# Patient Record
Sex: MALE
[De-identification: names, ages, dates, MRNs, and addresses within clinical notes are randomized per-mention and may not be internally consistent; named-entity substitution may affect disease eponyms.]

---

## 2021-08-03 PROBLEM — Z00.00 ENCOUNTER FOR PREVENTIVE HEALTH EXAMINATION: Status: ACTIVE | Noted: 2021-08-03

## 2023-03-19 ENCOUNTER — NON-APPOINTMENT (OUTPATIENT)
Age: 53
End: 2023-03-19

## 2023-03-19 ENCOUNTER — EMERGENCY (EMERGENCY)
Facility: HOSPITAL | Age: 53
LOS: 1 days | Discharge: HOME | End: 2023-03-19
Attending: EMERGENCY MEDICINE
Payer: COMMERCIAL

## 2023-03-19 VITALS
DIASTOLIC BLOOD PRESSURE: 68 MMHG | OXYGEN SATURATION: 100 % | TEMPERATURE: 98 F | RESPIRATION RATE: 18 BRPM | SYSTOLIC BLOOD PRESSURE: 113 MMHG | HEART RATE: 62 BPM

## 2023-03-19 VITALS
WEIGHT: 169.98 LBS | SYSTOLIC BLOOD PRESSURE: 158 MMHG | RESPIRATION RATE: 16 BRPM | TEMPERATURE: 98 F | HEIGHT: 69 IN | HEART RATE: 59 BPM | DIASTOLIC BLOOD PRESSURE: 90 MMHG | OXYGEN SATURATION: 98 %

## 2023-03-19 LAB
ALBUMIN SERPL ELPH-MCNC: 4.1 G/DL — SIGNIFICANT CHANGE UP (ref 3.3–5)
ALP SERPL-CCNC: 57 U/L — SIGNIFICANT CHANGE UP (ref 40–120)
ALT FLD-CCNC: 18 U/L — SIGNIFICANT CHANGE UP (ref 10–45)
ANION GAP SERPL CALC-SCNC: 14 MMOL/L — SIGNIFICANT CHANGE UP (ref 5–17)
APTT BLD: 23.2 SEC — LOW (ref 27.5–35.5)
AST SERPL-CCNC: 38 U/L — SIGNIFICANT CHANGE UP (ref 10–40)
BASOPHILS # BLD AUTO: 0 K/UL — SIGNIFICANT CHANGE UP (ref 0–0.2)
BASOPHILS NFR BLD AUTO: 0 % — SIGNIFICANT CHANGE UP (ref 0–2)
BILIRUB SERPL-MCNC: 0.4 MG/DL — SIGNIFICANT CHANGE UP (ref 0.2–1.2)
BUN SERPL-MCNC: 14 MG/DL — SIGNIFICANT CHANGE UP (ref 7–23)
CALCIUM SERPL-MCNC: 8.8 MG/DL — SIGNIFICANT CHANGE UP (ref 8.4–10.5)
CHLORIDE SERPL-SCNC: 106 MMOL/L — SIGNIFICANT CHANGE UP (ref 96–108)
CO2 SERPL-SCNC: 24 MMOL/L — SIGNIFICANT CHANGE UP (ref 22–31)
CREAT SERPL-MCNC: 0.75 MG/DL — SIGNIFICANT CHANGE UP (ref 0.5–1.3)
EGFR: 108 ML/MIN/1.73M2 — SIGNIFICANT CHANGE UP
EOSINOPHIL # BLD AUTO: 0 K/UL — SIGNIFICANT CHANGE UP (ref 0–0.5)
EOSINOPHIL NFR BLD AUTO: 0 % — SIGNIFICANT CHANGE UP (ref 0–6)
GLUCOSE SERPL-MCNC: 169 MG/DL — HIGH (ref 70–99)
HCT VFR BLD CALC: 39.3 % — SIGNIFICANT CHANGE UP (ref 39–50)
HGB BLD-MCNC: 13.2 G/DL — SIGNIFICANT CHANGE UP (ref 13–17)
INR BLD: 1.03 RATIO — SIGNIFICANT CHANGE UP (ref 0.88–1.16)
LYMPHOCYTES # BLD AUTO: 0.78 K/UL — LOW (ref 1–3.3)
LYMPHOCYTES # BLD AUTO: 11.5 % — LOW (ref 13–44)
MAGNESIUM SERPL-MCNC: 2.1 MG/DL — SIGNIFICANT CHANGE UP (ref 1.6–2.6)
MANUAL SMEAR VERIFICATION: SIGNIFICANT CHANGE UP
MCHC RBC-ENTMCNC: 30.5 PG — SIGNIFICANT CHANGE UP (ref 27–34)
MCHC RBC-ENTMCNC: 33.6 GM/DL — SIGNIFICANT CHANGE UP (ref 32–36)
MCV RBC AUTO: 90.8 FL — SIGNIFICANT CHANGE UP (ref 80–100)
MONOCYTES # BLD AUTO: 0.18 K/UL — SIGNIFICANT CHANGE UP (ref 0–0.9)
MONOCYTES NFR BLD AUTO: 2.6 % — SIGNIFICANT CHANGE UP (ref 2–14)
MYELOCYTES NFR BLD: 0.9 % — HIGH (ref 0–0)
NEUTROPHILS # BLD AUTO: 5.57 K/UL — SIGNIFICANT CHANGE UP (ref 1.8–7.4)
NEUTROPHILS NFR BLD AUTO: 82.3 % — HIGH (ref 43–77)
PLAT MORPH BLD: NORMAL — SIGNIFICANT CHANGE UP
PLATELET # BLD AUTO: 124 K/UL — LOW (ref 150–400)
POTASSIUM SERPL-MCNC: 4.1 MMOL/L — SIGNIFICANT CHANGE UP (ref 3.5–5.3)
POTASSIUM SERPL-SCNC: 4.1 MMOL/L — SIGNIFICANT CHANGE UP (ref 3.5–5.3)
PROT SERPL-MCNC: 6.1 G/DL — SIGNIFICANT CHANGE UP (ref 6–8.3)
PROTHROM AB SERPL-ACNC: 11.9 SEC — SIGNIFICANT CHANGE UP (ref 10.5–13.4)
RBC # BLD: 4.33 M/UL — SIGNIFICANT CHANGE UP (ref 4.2–5.8)
RBC # FLD: 12 % — SIGNIFICANT CHANGE UP (ref 10.3–14.5)
RBC BLD AUTO: SIGNIFICANT CHANGE UP
SODIUM SERPL-SCNC: 144 MMOL/L — SIGNIFICANT CHANGE UP (ref 135–145)
VARIANT LYMPHS # BLD: 2.7 % — SIGNIFICANT CHANGE UP (ref 0–6)
WBC # BLD: 6.77 K/UL — SIGNIFICANT CHANGE UP (ref 3.8–10.5)
WBC # FLD AUTO: 6.77 K/UL — SIGNIFICANT CHANGE UP (ref 3.8–10.5)

## 2023-03-19 PROCEDURE — 96375 TX/PRO/DX INJ NEW DRUG ADDON: CPT | Mod: XU

## 2023-03-19 PROCEDURE — 0042T: CPT | Mod: MA

## 2023-03-19 PROCEDURE — 82962 GLUCOSE BLOOD TEST: CPT

## 2023-03-19 PROCEDURE — 99291 CRITICAL CARE FIRST HOUR: CPT

## 2023-03-19 PROCEDURE — 93005 ELECTROCARDIOGRAM TRACING: CPT

## 2023-03-19 PROCEDURE — 70496 CT ANGIOGRAPHY HEAD: CPT | Mod: MG

## 2023-03-19 PROCEDURE — 99291 CRITICAL CARE FIRST HOUR: CPT | Mod: 25

## 2023-03-19 PROCEDURE — 85610 PROTHROMBIN TIME: CPT

## 2023-03-19 PROCEDURE — 36415 COLL VENOUS BLD VENIPUNCTURE: CPT

## 2023-03-19 PROCEDURE — 84484 ASSAY OF TROPONIN QUANT: CPT

## 2023-03-19 PROCEDURE — 80053 COMPREHEN METABOLIC PANEL: CPT

## 2023-03-19 PROCEDURE — 85730 THROMBOPLASTIN TIME PARTIAL: CPT

## 2023-03-19 PROCEDURE — 96374 THER/PROPH/DIAG INJ IV PUSH: CPT | Mod: XU

## 2023-03-19 PROCEDURE — 70496 CT ANGIOGRAPHY HEAD: CPT | Mod: 26,MG

## 2023-03-19 PROCEDURE — 70450 CT HEAD/BRAIN W/O DYE: CPT | Mod: MA

## 2023-03-19 PROCEDURE — G1004: CPT

## 2023-03-19 PROCEDURE — 70498 CT ANGIOGRAPHY NECK: CPT | Mod: MG

## 2023-03-19 PROCEDURE — 83735 ASSAY OF MAGNESIUM: CPT

## 2023-03-19 PROCEDURE — 85025 COMPLETE CBC W/AUTO DIFF WBC: CPT

## 2023-03-19 PROCEDURE — 70498 CT ANGIOGRAPHY NECK: CPT | Mod: 26,MG

## 2023-03-19 RX ORDER — MECLIZINE HCL 12.5 MG
25 TABLET ORAL ONCE
Refills: 0 | Status: COMPLETED | OUTPATIENT
Start: 2023-03-19 | End: 2023-03-19

## 2023-03-19 RX ORDER — SODIUM CHLORIDE 9 MG/ML
1000 INJECTION INTRAMUSCULAR; INTRAVENOUS; SUBCUTANEOUS ONCE
Refills: 0 | Status: COMPLETED | OUTPATIENT
Start: 2023-03-19 | End: 2023-03-19

## 2023-03-19 RX ORDER — MECLIZINE HCL 12.5 MG
1 TABLET ORAL
Qty: 15 | Refills: 0
Start: 2023-03-19 | End: 2023-03-23

## 2023-03-19 RX ORDER — ONDANSETRON 8 MG/1
4 TABLET, FILM COATED ORAL ONCE
Refills: 0 | Status: COMPLETED | OUTPATIENT
Start: 2023-03-19 | End: 2023-03-19

## 2023-03-19 RX ORDER — METOCLOPRAMIDE HCL 10 MG
10 TABLET ORAL ONCE
Refills: 0 | Status: COMPLETED | OUTPATIENT
Start: 2023-03-19 | End: 2023-03-19

## 2023-03-19 RX ADMIN — SODIUM CHLORIDE 1000 MILLILITER(S): 9 INJECTION INTRAMUSCULAR; INTRAVENOUS; SUBCUTANEOUS at 01:08

## 2023-03-19 RX ADMIN — ONDANSETRON 4 MILLIGRAM(S): 8 TABLET, FILM COATED ORAL at 02:07

## 2023-03-19 RX ADMIN — Medication 10 MILLIGRAM(S): at 02:48

## 2023-03-19 RX ADMIN — Medication 25 MILLIGRAM(S): at 01:08

## 2023-03-19 NOTE — ED PROVIDER NOTE - NSFOLLOWUPINSTRUCTIONS_ED_ALL_ED_FT
Benign Positional Vertigo    Take Meclizine 25mg every 8 hours as needed for dizziness/vertigo symptoms.      Vertigo is the feeling that you or your surroundings are moving when they are not. Benign positional vertigo is the most common form of vertigo. The cause of this condition is not serious (is benign). This condition is triggered by certain movements and positions (is positional). This condition can be dangerous if it occurs while you are doing something that could endanger you or others, such as driving.    What are the causes?  In many cases, the cause of this condition is not known. It may be caused by a disturbance in an area of the inner ear that helps your brain to sense movement and balance. This disturbance can be caused by a viral infection (labyrinthitis), head injury, or repetitive motion.    What increases the risk?  This condition is more likely to develop in:  Women.  People who are 50 years of age or older.  What are the signs or symptoms?  Symptoms of this condition usually happen when you move your head or your eyes in different directions. Symptoms may start suddenly, and they usually last for less than a minute. Symptoms may include:  Loss of balance and falling.  Feeling like you are spinning or moving.  Feeling like your surroundings are spinning or moving.  Nausea and vomiting.  Blurred vision.  Dizziness.  Involuntary eye movement (nystagmus).  Symptoms can be mild and cause only slight annoyance, or they can be severe and interfere with daily life. Episodes of benign positional vertigo may return (recur) over time, and they may be triggered by certain movements. Symptoms may improve over time.    How is this diagnosed?  This condition is usually diagnosed by medical history and a physical exam of the head, neck, and ears. You may be referred to a health care provider who specializes in ear, nose, and throat (ENT) problems (otolaryngologist) or a provider who specializes in disorders of the nervous system (neurologist). You may have additional testing, including:  MRI.  A CT scan.  Eye movement tests. Your health care provider may ask you to change positions quickly while he or she watches you for symptoms of benign positional vertigo, such as nystagmus. Eye movement may be tested with an electronystagmogram (ENG), caloric stimulation, the Danica-Hallpike test, or the roll test.  An electroencephalogram (EEG). This records electrical activity in your brain.  Hearing tests.  How is this treated?  Usually, your health care provider will treat this by moving your head in specific positions to adjust your inner ear back to normal. Surgery may be needed in severe cases, but this is rare. In some cases, benign positional vertigo may resolve on its own in 2-4 weeks.    Follow these instructions at home:  Safety     Move slowly.  Avoid sudden body or head movements.  Avoid driving.  Avoid operating heavy machinery.  Avoid doing any tasks that would be dangerous to you or others if a vertigo episode would occur.  If you have trouble walking or keeping your balance, try using a cane for stability. If you feel dizzy or unstable, sit down right away.  Return to your normal activities as told by your health care provider. Ask your health care provider what activities are safe for you.  General instructions     Take over-the-counter and prescription medicines only as told by your health care provider.  Avoid certain positions or movements as told by your health care provider.  Drink enough fluid to keep your urine clear or pale yellow.  Keep all follow-up visits as told by your health care provider. This is important.    Contact a health care provider if:  You have a fever.  Your condition gets worse or you develop new symptoms.  Your family or friends notice any behavioral changes.  Your nausea or vomiting gets worse.  You have numbness or a “pins and needles” sensation.  Get help right away if:  You have difficulty speaking or moving.  You are always dizzy.  You faint.  You develop severe headaches.  You have weakness in your legs or arms.  You have changes in your hearing or vision.  You develop a stiff neck.  You develop sensitivity to light.

## 2023-03-19 NOTE — STROKE CODE NOTE - DISPOSITION
Stroke code called for dizziness that began 6PM 3/18. Worse with head movement, right horizontal nystagmus, no vertical skew, HIINTS exam suggestive of peripheral etiology. Patient says dizziness much improved s/p meclizine. Would c/w meclizine, Clonzepam 0.5mg  q8 PRN, Reglan 4mg  PRN Q8H, fluids, orthostatics, outpatient vestibular rehab.

## 2023-03-19 NOTE — ED ADULT NURSE NOTE - OBJECTIVE STATEMENT
Pt is 54 y/o male, presenting to the ED c/o vomiting via EMS. As per pt, went to the bathroom when he began to feel dizzy and vomited x2. Pt reports PMH of HTN and "stomach problems". Upon assessment, pt AxOx3, sitting up in stretcher, and lethargic. Pt reports when he opens his eyes, feels dizzy. Pt moves all extremities w/ = strength. PERRLA. No facial droop noted, pt denies numbness/tingling. Pt placed on continuous pulse ox, >95% RA. Abdomen is soft and non-tender to palpation. Pt placed on continuous cardiac monitor. Skin warm, dry, and intact w/ + peripheral pulses. Pt denies chest pain, SOB, diarrhea, chills, and fever. Safety and comfort measures provided- bed in lowest position, locked, and blanket given.

## 2023-03-19 NOTE — ED PROVIDER NOTE - PROGRESS NOTE DETAILS
Hayder DELCID: Pt is stable and ready for discharge. Strict return precautions given. All questions answered. Spoke w/ neurosurgery who agrees w/ outpatient f/u for aneurysms found on CT angio. PO passed.

## 2023-03-19 NOTE — ED PROVIDER NOTE - CLINICAL SUMMARY MEDICAL DECISION MAKING FREE TEXT BOX
54 y/o male w/ PMH HTN, GERD w/o PSH c/o 1 hour history of vertigo-like sensation, nausea, and NBNB vomiting. Denies headaches, chest pain, SOB, abdominal pain, dysuria, hematuria. Pt's symptoms began after eating shrimp and beer. Bradycardic and hypertensive despite HTN med use, concerning for ICP pathology. Rotatory nystagmus present. Will draw labs, lipase, CTH and CT head/neck angio for vascular pathology/mass, and reassess.

## 2023-03-19 NOTE — ED PROVIDER NOTE - OBJECTIVE STATEMENT
54 y/o male w/ PMH HTN, GERD w/o PSH c/o 1 hour history of vertigo-like sensation, nausea, and NBNB vomiting. Denies headaches, chest pain, SOB, abdominal pain, dysuria, hematuria. Pt's symptoms began after eating shrimp and beer.

## 2023-03-19 NOTE — ED PROVIDER NOTE - ATTENDING CONTRIBUTION TO CARE
Attending Statement (LIBAN Bower MD):    HPI: 53-year-old male history of HTN, GERD, presenting with room spinning sensation that began approximately 1 hour ago associated with multiple episodes of vomiting.  Worse with changing position, improves with laying down and closing eyes.  Symptoms began shortly after eating a meal of shrimp and beer.  No abdominal pain.  No diarrhea.  No fever.  No ear pain or ringing in ears.  No rashes reported.  No weakness in upper or lower extremities.  Unable to walk due to dizziness.  No history of vertigo previously.    Review of Systems:  -General: no fever   -ENT: no congestion  -Pulmonary: no cough, no shortness of breath  -Cardiac: no chest pain  -Gastrointestinal: See HPI.  -Genitourinary: no blood or pain with urination  -Musculoskeletal: no back or neck pain  -Skin: no rashes  -Endocrine: No h/o diabetes  -Neurologic: See HPI    All else negative unless otherwise specified elsewhere in this note.    On Physical Exam:  General: Appears uncomfortable but is in no acute distress and is speaking clearly in full sentences and without difficulty; cooperative with exam  HEENT: anicteric sclera, airway patent, PERRL, normal TM and external auditory canals bilaterally with no erythema or vesicles noted  Neck: no JVD, no nuchal rigidity  Cardiac: regular, s1 s2  Lungs: CTABL  Abdomen: soft nontender/nondistended  : no bladder tenderness or distension  Skin: intact, no rash  Extremities: no peripheral edema, no gross deformities  Neuro: CN II through XII grossly intact except for notable rightward horizontal nystagmus that is fatigable and is elicited with change in head position.  Positive Danica-Hallpike to the right with nystagmus.  Hints exam is consistent with peripheral vertigo.  5 out of 5 strength in upper and lower extremities across major joints with flexion and extension.  No gross regions of sensation loss in extremities.  Unable to assess gait at this time.    MDM: 53-year-old male history of HTN, GERD presenting with 1 hour prior onset of vertigo.  Initial stroke code called for questionable rotary nystagmus and given onset of symptoms however further history and evaluation and agreement with neuro/stroke team, stroke code was canceled.  Patient had CT CTA of head and neck and CT perfusion which revealed no evidence of acute infarct or ischemia.  Additionally no evidence of mass or mass effect.  Incidental vertebral artery aneurysm 3 mm of likely little significance (not concerning for acute bleed on this presentation); to f/u outpatient.  Patient improved significantly after IV fluids meclizine and Reglan.  Initial laboratory evaluation including CBC CMP and urinalysis revealed no acute actionable findings.  EKG was normal sinus rhythm without significant interval abnormalities or signs of acute ischemia or infarct.  Given improvement patient eligible for discharge.  To follow-up as outpatient as instructed.

## 2023-03-19 NOTE — ED PROVIDER NOTE - PHYSICAL EXAMINATION
GENERAL: Mild distress  HEENT:  Atraumatic  CHEST/LUNG: Chest rise equal bilaterally  HEART: Regular rate and rhythm  ABDOMEN: Soft, Nontender, Nondistended. No CVA tenderness  EXTREMITIES:  Extremities warm  PSYCH: A&Ox3  SKIN: No obvious rashes or lesions  NEUROLOGY: strength and sensation intact in all extremities. CN 2 - 12 intact. Horizontal and rotatory nystagmus on examination.

## 2023-03-19 NOTE — ED PROVIDER NOTE - CARE PROVIDER_API CALL
Rosa Cagle)  Neurology; Vascular Neurology  [address], [address]  [place] [address]  Phone: [phone]  Fax: [phone]  Follow Up Time: Urgent

## 2023-03-19 NOTE — ED PROVIDER NOTE - PATIENT PORTAL LINK FT
You can access the FollowMyHealth Patient Portal offered by [facility] by registering at the following website: [email or web address]. By joining [facility]’s FollowMyHealth portal, you will also be able to view your health information using other applications (apps) compatible with our system.

## 2023-03-19 NOTE — ED PROVIDER NOTE - NSFOLLOWUPCLINICS_GEN_ALL_ED_FT
[facility]  Neurology  [address] [place] [address] - [address] [place] [address]  Phone: [phone]  Fax:   Follow Up Time: Urgent

## 2023-03-20 PROBLEM — Z00.00 ENCOUNTER FOR PREVENTIVE HEALTH EXAMINATION: Noted: 2023-03-20

## 2023-03-28 ENCOUNTER — APPOINTMENT (OUTPATIENT)
Dept: NEUROSURGERY | Facility: CLINIC | Age: 53
End: 2023-03-28
Payer: COMMERCIAL

## 2023-03-28 ENCOUNTER — NON-APPOINTMENT (OUTPATIENT)
Age: 53
End: 2023-03-28

## 2023-03-28 VITALS
OXYGEN SATURATION: 96 % | SYSTOLIC BLOOD PRESSURE: 157 MMHG | BODY MASS INDEX: 24.44 KG/M2 | DIASTOLIC BLOOD PRESSURE: 89 MMHG | HEIGHT: 69 IN | HEART RATE: 65 BPM | WEIGHT: 165 LBS

## 2023-03-28 DIAGNOSIS — R42 DIZZINESS AND GIDDINESS: ICD-10-CM

## 2023-03-28 DIAGNOSIS — Z78.9 OTHER SPECIFIED HEALTH STATUS: ICD-10-CM

## 2023-03-28 PROCEDURE — 99205 OFFICE O/P NEW HI 60 MIN: CPT

## 2023-03-29 PROBLEM — Z78.9 NON-SMOKER: Status: ACTIVE | Noted: 2023-03-28

## 2023-03-29 RX ORDER — AMLODIPINE BESYLATE 5 MG/1
TABLET ORAL
Refills: 0 | Status: ACTIVE | COMMUNITY

## 2023-03-29 RX ORDER — FENOFIBRATE 120 MG/1
TABLET ORAL
Refills: 0 | Status: ACTIVE | COMMUNITY

## 2023-03-29 RX ORDER — OMEPRAZOLE 20 MG/1
TABLET, DELAYED RELEASE ORAL
Refills: 0 | Status: ACTIVE | COMMUNITY

## 2023-03-29 NOTE — REASON FOR VISIT
[FreeTextEntry1] : 3/19/23 ED / HFU for dizziness\par \par PACS/NW 3/19/23 CTA - L VA aneurysm - full report below

## 2023-03-29 NOTE — PHYSICAL EXAM
[FreeTextEntry1] : Awake, alert, and oriented x 3. VSS. In no apparent distress.  \par \par  EOMI, no nystagmus, facial sensation intact symmetrically, face symmetrical, head turning and shoulder shrug symmetric and no tongue deviation with protrusion.  No pronator drift.   No past-pointing, no tremors noted, no dysdiadochokinesia, and finger to nose dysmetria was not present.  Romberg negative.  \par Right hand dominant.\par Rises from a seated position in a comfortable fashion.\par \par Gait is well coordinated and stable without the use of an assistive device.  Motor strength in the upper extremities 5/5 in the biceps, triceps, and hand .  Motor strength in the lower extremities is 5/5 in the iliopsoas, quadriceps, and hamstrings.  \par \par

## 2023-03-29 NOTE — ASSESSMENT
[FreeTextEntry1] : Impression:\par 52 yo male with Left vertebral artery small aneurysm pt asymptomatic at this time will refer for vestibular therapy as recommended upon discharge for vertigo.\par \par \par PLAN:\par \par MRI Brain w/wo - asap\par RTO 1 month\par Will discuss possibility of evaluation of aneurysm with formal cerebral angiography \par

## 2023-03-29 NOTE — HISTORY OF PRESENT ILLNESS
[FreeTextEntry1] : 54 yo male with PMH of former light smoker, presented to ED on 3/28/23 with dizziness now resolved.  Stroke code cancelled believed to be vertigo, improved with Meclizine.\par \par Copied from 3/19/23 DISPOSITION:\par Stroke Code Disposition:\par Disposition Stroke code called for dizziness that began 6PM 3/18. Worse with\par head movement, right horizontal nystagmus, no vertical skew, HIINTS exam\par suggestive of peripheral etiology. Patient says dizziness much improved s/p\par meclizine. Would c/w meclizine, Clonzepam 0.5mg  q8 PRN, Reglan 4mg  PRN Q8H,\par fluids, orthostatics, outpatient vestibular rehab.\par  \par

## 2023-03-29 NOTE — RESULTS/DATA
[FreeTextEntry1] : [facility]\par    [facility] Department of Radiology\par   Radiology Report\par \par \par Patient Name: MILTON JACOBO   Report Date: 19-Mar-2023 03:13.00 \par Patient ID: [id number] ([id number]), [id number] (EPI)  Accession No.: [id number] \par Patient Birth Date: 1970  Report Status: F \par Referring Physician: [id number] SAHIL TEJEDA   Reason For Study: Dizziness, non-specific  \par \par \par ACC: [id number] EXAM: CT ANGIO BRAIN (W)AW IC ORDERED BY: ILEANA BAXTER\par \par ACC: [id number] EXAM: CT ANGIO NECK (W)AW IC ORDERED BY: ILEANA BAXTER\par \par ACC: [id number] EXAM: CT BRAIN PERFUSION MAPS STROKE ORDERED BY: LAY ANTONY\par \par PROCEDURE DATE: 03/19/2023\par \par \par \par INTERPRETATION: HISTORY: Dizziness\par \par COMPARISON: None\par \par TECHNIQUE: CT angiogram of the neck and brain was performed after administration of intravenous contrast. MIP reconstructions were performed.\par \par Perfusion CT through the brain obtained during separate bolus injection of intravenous contrast. Processing of the CT perfusion data, specifically, maps of mean transit time, cerebral blood flow and cerebral blood volume were generated using [employer] RAPID.\par \par Total of 70 cc Omnipaque 300 intravenous contrast were administered without complication. 30 cc intravenous contrast discarded.\par \par FINDINGS:\par \par CT PERFUSION:\par \par CBF <30%: 0 ml\par Tmax > 6s: 0 ml\par Mismatch volume= 0 ml\par \par At the imaged levels, normal mean transit time, cerebral blood flow, and cerebral blood volume are demonstrated bilaterally. There is no evidence of irreversible, potentially reversible, or compensated perfusion abnormality within these portions of the brain.\par \par CTA BRAIN\par \par INTERNAL CAROTID ARTERIES: The intracranial segments of the ICA are patent without hemodynamically significant stenosis, occlusion, or aneurysm. The ICA bifurcations are unremarkable.\par \par ANTERIOR CEREBRAL ARTERIES: No flow-limiting stenosis or occlusion. Anterior communicating artery is unremarkable without aneurysm.\par \par MIDDLE CEREBRAL ARTERIES: No flow-limiting stenosis or occlusion. MCA bifurcations are unremarkable without aneurysm.\par \par POSTERIOR CEREBRAL ARTERIES: No flow-limiting stenosis or occlusion. Posterior communicating arteries are not well seen bilaterally.\par \par VERTEBROBASILAR SYSTEM: No flow-limiting stenosis or occlusion. Basilar tip is unremarkable.\par \par CTA NECK\par \par RIGHT CAROTID SYSTEM: Normal in course and caliber without flow-limiting stenosis or occlusion.\par \par LEFT CAROTID SYSTEM: Normal in course and caliber without flow-limiting stenosis or occlusion.\par \par VERTEBRAL SYSTEM: Normal in course and caliber without flow-limiting stenosis or occlusion. 3 mm aneurysm arising from the V4 segment of the left vertebral artery (sequence 604, image 63 and sequence 6, image 333) Origin of the vertebral arteries are unremarkable.\par \par AORTIC ARCH: Origin of the great vessels are unremarkable.\par \par IMPRESSION:\par \par CT PERFUSION:\par Normal perfusion.\par \par CTA HEAD AND NECK:\par Patent intracranial circulation. No flow-limiting stenosis or occlusion.\par Patent cervical vasculature. No flow limiting stenosis or occlusion.\par 3 mm aneurysm arising from the V4 segment of the left vertebral artery\par \par --- End of Report ---\par \par  MUKUL ISLAS MD; Resident Radiologist\par This document has been electronically signed.\par BEAR ZARAGOZA MD; Attending Radiologist\par This document has been electronically signed. Mar 19 2023 3:13AM

## 2023-03-29 NOTE — END OF VISIT
[FreeTextEntry3] : Impression: Left vertebral artery V4 segment proximal to PICA origin small aneurysm. Difficult to establish per CT angiogram if dissecting aneurysm or saccular. I will evaluate MR of the brain to completely exclude ischemic stroke and in the follow up visit I will discuss with patient cerebral angiography for better characterization of aneurysm.  [Time Spent: ___ minutes] : I have spent [unfilled] minutes of time on the encounter.

## 2023-03-31 ENCOUNTER — APPOINTMENT (OUTPATIENT)
Dept: MRI IMAGING | Facility: CLINIC | Age: 53
End: 2023-03-31
Payer: COMMERCIAL

## 2023-03-31 ENCOUNTER — OUTPATIENT (OUTPATIENT)
Dept: OUTPATIENT SERVICES | Facility: HOSPITAL | Age: 53
LOS: 1 days | End: 2023-03-31
Payer: COMMERCIAL

## 2023-03-31 DIAGNOSIS — I72.9 ANEURYSM OF UNSPECIFIED SITE: ICD-10-CM

## 2023-03-31 DIAGNOSIS — Z00.8 ENCOUNTER FOR OTHER GENERAL EXAMINATION: ICD-10-CM

## 2023-03-31 PROCEDURE — A9585: CPT

## 2023-03-31 PROCEDURE — 70553 MRI BRAIN STEM W/O & W/DYE: CPT

## 2023-03-31 PROCEDURE — 70553 MRI BRAIN STEM W/O & W/DYE: CPT | Mod: 26

## 2023-04-02 ENCOUNTER — TRANSCRIPTION ENCOUNTER (OUTPATIENT)
Age: 53
End: 2023-04-02

## 2023-05-02 ENCOUNTER — NON-APPOINTMENT (OUTPATIENT)
Age: 53
End: 2023-05-02

## 2023-05-23 ENCOUNTER — APPOINTMENT (OUTPATIENT)
Dept: NEUROSURGERY | Facility: CLINIC | Age: 53
End: 2023-05-23
Payer: COMMERCIAL

## 2023-05-23 VITALS
BODY MASS INDEX: 24.44 KG/M2 | SYSTOLIC BLOOD PRESSURE: 157 MMHG | HEIGHT: 69 IN | HEART RATE: 64 BPM | DIASTOLIC BLOOD PRESSURE: 96 MMHG | WEIGHT: 165 LBS | OXYGEN SATURATION: 97 %

## 2023-05-23 DIAGNOSIS — I72.9 ANEURYSM OF UNSPECIFIED SITE: ICD-10-CM

## 2023-05-23 PROCEDURE — 99214 OFFICE O/P EST MOD 30 MIN: CPT

## 2023-05-31 NOTE — END OF VISIT
[FreeTextEntry3] : Agree with assessment and plan as above. Recommended ASA 81 mg as secondary stroke prevention measures given findings at MR of the brain of chronic lacunar infarction. We reviewed secondary prevention measures as tight BP control <140/90 ideally 130 systolic, and LDL <70. He will follow with PCP. \par  [Time Spent: ___ minutes] : I have spent [unfilled] minutes of time on the encounter.

## 2023-05-31 NOTE — RESULTS/DATA
[FreeTextEntry1] : [facility]\par    [facility] Department of Radiology\par   Radiology Report\par \par \par Patient Name: MILTON JACOBO   Report Date: 31-Mar-2023 17:20.00 \par Patient ID: [id number] ([id number]), [id number] (EPI)  Accession No.: [id number] \par Patient Birth Date: 1970  Report Status: F \par Referring Physician: [id number] AZIZA WHITE   Reason For Study: Evaluate vertebral artery aneurysm and any pathology;  \par \par \par \par \par \par \par \par \par EXAM: [id number] - MR BRAIN WAW IC FOR SRS - ORDERED BY: KAYLI ERVIN\par \par \par PROCEDURE DATE: 03/31/2023\par \par \par \par INTERPRETATION: MR of the brain with and without gadolinium contrast\par \par CLINICAL INFORMATION: Vertebral arterial aneurysm\par \par TECHNIQUE: Sagittal and axial T1-weighted images, axial FLAIR images, axial susceptibility weighted images, axial T2-weighted images and axial diffusion weighted images of the brain were obtained. Following gadolinium administration 7.5 cc administered axial volumetric T1 weighted images were obtained. This data set was reconstructed as sagittal and coronal images. Subsequent axial T1-weighted acquisition was obtained.\par \par COMPARISON: CT head stroke code 3/19/2023 available for review.\par \par FINDINGS:\par \par BRAIN: The brain remains significant for small focal lesion of remote infarction within the right mid corona radiata white matter. No cerebral cortical lesion has developed. No abnormal enhancement is found in the brain. No diffusion restriction is found in the brain. No acute cerebral cortical infarct is found. No intracranial hemorrhage is recognized. No mass effect is found in the brain. The brain also demonstrates several small scattered focal lesions within the cerebral hemispheric white matter more typical for ischemic white matter disease. These lesions are hyperintense on the long TR images, otherwise inconspicuous. Involvement is greatest in the corona radiata white matter.\par \par CSF SPACES: The ventricles, sulci and basal cisterns appear mildly dilated reflecting diffuse brain volume loss.\par \par VESSELS: The vertebral and internal carotid arteries demonstrate expected flow voids indicating their patency. Mild lobulated deformity of the left vertebral artery intracranial segment AP best demonstrated on coronal MPR reformat of the volumetric post gadolinium T1-weighted gradient echo acquisition on image 122, better demonstrated by CT angiography. The principal dural sinuses are patent.\par \par HEAD AND NECK STRUCTURES: The orbits are unremarkable. Paranasal sinuses are clear. The nasal cavity appears intact. The central skull base appears intact. The nasopharynx is symmetric. The temporal bones appear clear of disease. The calvarium appears unremarkable.\par \par \par IMPRESSION:\par \par 1. Right mid corona radiata remote deep infarction\par \par 2. Cerebral hemispheric white matter lesions suggest ischemic white matter disease likely onset\par \par 3. Left vertebral arterial saccular aneurysm is better seen with the CT angiography technique, seen as a contour deformity of the vessel on the gadolinium-enhanced T1-weighted images.\par \par --- End of Report ---\par \par \par OTILIA LUX MD; Attending Radiologist\par This document has been electronically signed. Mar 31 2023 5:20PM

## 2023-05-31 NOTE — ASSESSMENT
[FreeTextEntry1] : Impression:\par 53M with Left vertebral artery small aneurysm pt asymptomatic at this time will refer for vestibular therapy as recommended upon discharge for vertigo, dizziness improving. MRI brain w/wo 3/31/23 shows chronic right mid corona radiata remote deep infarction, no acute infarcts, cerebral hemispheric white matter lesions suggest ischemic white matter disease, left vertebral artery aneurysm. Once again difficult for me to distinguish certainly with non-invasive imaging if saccular or dissecting.\par \par Counseled patient on the natural history of aneurysms, discussed risk of aneurysm rupture with the patient and signs and symptoms of subarachnoid hemorrhage, which is a life-threatening condition. Should he have severe, sudden onset worst headache of his life, advised that he must seek medical attention immediately and go to the emergency room if this occurs. \par \par Recommend formal diagnostic cerebral angiogram as the next step for further evaluation of unruptured aneurysm and ultimately favor treatment for his aneurysm with pipeline flow-diverting stent; however, assured patient his dizziness is likely unrelated to this aneurysm finding. This procedure will obtain more information about the aneurysm and assess its size and morphology in preparation for treatment. The risks, benefits, alternatives, complications and personnel associated with the procedure were discussed with the patient and family in great detail via .\par \par \par \par Plan:\par Patient would like to proceed with conservative management with CTA head and neck w/wo in 6 months from previous imaging - September 2023\par Follow up after above to review results of imaging\par Would recommend treatment at that point should there be substantial growth of aneurysm\par If he decides to pursue treatment for his aneurysm, advised to reach back out to office\par Start ASA 81mg daily due to evidence of old stroke on MRI\par Follow up with PCP regarding LDL and BP management\par \par Patient knows to call the office if there are any new or worsening symptoms.\par All questions and concerns answered and addressed in detail to patient's complete satisfaction.  Patient verbalized understanding and agreed to plan.\par \par Stroke prevention requires management of risk factors and patient education.  Risk factors include smoking, excess weight, hypertension, dyslipidemia, heavy alcohol use, physical inactivity, obesity and diabetes.  Blood pressure should be < 140/90.  Lipid lowering agents may reduce risk of stroke.   patient to maintain regular exercise and body weight and control intake of alcohol.

## 2023-05-31 NOTE — HISTORY OF PRESENT ILLNESS
[FreeTextEntry1] : DONNELL ROSE is a 53 year old Mandarin speaking male with PMH of HTN, former light smoker, presented to ED on 3/28/23 with sudden onset dizziness now resolved. Dizziness began 6pm 3/18, Worse with head movement, right horizontal nystagmus, no vertical skew. Stroke code called but then cancelled believed to be vertigo, improved with Meclizine. Instructed to follow up with outpatient vestibular rehab. 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 He is not a current smoker. Denies known family history of aneurysms or aneurysm rupture.\par \par Today, he presents with his son to the office for follow up visit to review results of imaging. States his dizziness is improving and almost completely resolved. He is not currently taking Meclizine. Per patient, he followed up with PCP for routine labs, cholesterol check about 4-5 months ago.

## 2023-05-31 NOTE — REASON FOR VISIT
[Follow-Up: _____] : a [unfilled] follow-up visit [Family Member] : family member [Interpreters_IDNumber] : [id number] [Interpreters_FullName] : Nicholas [FreeTextEntry3] : Mandarin [FreeTextEntry1] : 1 month f/u s/p 3/19/23 Left Vertebral Artery aneurysm\par \par 3/31/23 MRI Brain w/wo - [facility]/ [facility] - full report below

## 2023-09-19 ENCOUNTER — APPOINTMENT (OUTPATIENT)
Dept: NEUROSURGERY | Facility: CLINIC | Age: 53
End: 2023-09-19